# Patient Record
Sex: MALE | Race: WHITE | Employment: UNEMPLOYED | URBAN - METROPOLITAN AREA
[De-identification: names, ages, dates, MRNs, and addresses within clinical notes are randomized per-mention and may not be internally consistent; named-entity substitution may affect disease eponyms.]

---
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
## 2021-10-28 PROBLEM — M54.2 CERVICALGIA: Status: ACTIVE | Noted: 2021-10-28

## 2021-10-28 PROBLEM — S06.0X1A: Status: ACTIVE | Noted: 2021-10-28

## 2021-10-28 PROBLEM — R55 SYNCOPE AND COLLAPSE: Status: ACTIVE | Noted: 2021-10-28

## 2021-12-29 PROBLEM — R55 SYNCOPE, UNSPECIFIED SYNCOPE TYPE: Status: ACTIVE | Noted: 2021-10-28

## 2021-12-29 PROBLEM — F07.81 POSTCONCUSSIONAL SYNDROME: Status: ACTIVE | Noted: 2021-12-29

## 2021-12-29 PROBLEM — G90.3 NEUROGENIC ORTHOSTATIC HYPOTENSION (HCC): Status: ACTIVE | Noted: 2021-12-29

## 2021-12-29 PROBLEM — S06.0X9A CONCUSSION WITH < 1 HR LOSS OF CONSCIOUSNESS: Status: ACTIVE | Noted: 2021-12-29

## (undated) NOTE — ED AVS SNAPSHOT
Barrington Harvey   MRN: [id number]    Department:  [facility] Emergency Department in [place]   Date of Visit:  6/10/2018           Disclosure     Insurance plans vary and the physician(s) referred by the ER may not be covered by your plan.  Please contact tell this physician (or your personal doctor if your instructions are to return to your personal doctor) about any new or lasting problems. The primary care or specialist physician will see patients referred from the [facility] Emergency Department.  Harley Liz